# Patient Record
Sex: FEMALE | Race: WHITE | NOT HISPANIC OR LATINO | Employment: FULL TIME | URBAN - METROPOLITAN AREA
[De-identification: names, ages, dates, MRNs, and addresses within clinical notes are randomized per-mention and may not be internally consistent; named-entity substitution may affect disease eponyms.]

---

## 2020-01-31 ENCOUNTER — OFFICE VISIT (OUTPATIENT)
Dept: URGENT CARE | Facility: CLINIC | Age: 35
End: 2020-01-31
Payer: COMMERCIAL

## 2020-01-31 VITALS
BODY MASS INDEX: 24.01 KG/M2 | HEIGHT: 67 IN | SYSTOLIC BLOOD PRESSURE: 122 MMHG | DIASTOLIC BLOOD PRESSURE: 81 MMHG | WEIGHT: 153 LBS | OXYGEN SATURATION: 100 % | TEMPERATURE: 99 F | HEART RATE: 68 BPM

## 2020-01-31 DIAGNOSIS — R30.0 DYSURIA: ICD-10-CM

## 2020-01-31 DIAGNOSIS — N39.0 URINARY TRACT INFECTION WITHOUT HEMATURIA, SITE UNSPECIFIED: Primary | ICD-10-CM

## 2020-01-31 DIAGNOSIS — M62.838 NECK MUSCLE SPASM: ICD-10-CM

## 2020-01-31 DIAGNOSIS — R51.9 NONINTRACTABLE HEADACHE, UNSPECIFIED CHRONICITY PATTERN, UNSPECIFIED HEADACHE TYPE: ICD-10-CM

## 2020-01-31 LAB
BILIRUB UR QL STRIP: POSITIVE
GLUCOSE UR QL STRIP: POSITIVE
KETONES UR QL STRIP: POSITIVE
LEUKOCYTE ESTERASE UR QL STRIP: NEGATIVE
PH, POC UA: 5.5 (ref 5–8)
POC BLOOD, URINE: NEGATIVE
POC NITRATES, URINE: POSITIVE
PROT UR QL STRIP: POSITIVE
SP GR UR STRIP: 1.02 (ref 1–1.03)
UROBILINOGEN UR STRIP-ACNC: NORMAL (ref 0.1–1.1)

## 2020-01-31 PROCEDURE — 81003 POCT URINALYSIS, DIPSTICK, AUTOMATED, W/O SCOPE: ICD-10-PCS | Mod: QW,S$GLB,, | Performed by: PHYSICIAN ASSISTANT

## 2020-01-31 PROCEDURE — 99203 OFFICE O/P NEW LOW 30 MIN: CPT | Mod: 25,S$GLB,, | Performed by: PHYSICIAN ASSISTANT

## 2020-01-31 PROCEDURE — 81003 URINALYSIS AUTO W/O SCOPE: CPT | Mod: QW,S$GLB,, | Performed by: PHYSICIAN ASSISTANT

## 2020-01-31 PROCEDURE — 99203 PR OFFICE/OUTPT VISIT, NEW, LEVL III, 30-44 MIN: ICD-10-PCS | Mod: 25,S$GLB,, | Performed by: PHYSICIAN ASSISTANT

## 2020-01-31 RX ORDER — ALPRAZOLAM 2 MG/1
2 TABLET ORAL
COMMUNITY

## 2020-01-31 RX ORDER — CIPROFLOXACIN 250 MG/1
TABLET, FILM COATED ORAL
COMMUNITY
Start: 2019-12-04

## 2020-01-31 RX ORDER — CYCLOBENZAPRINE HCL 10 MG
TABLET ORAL
COMMUNITY
Start: 2019-12-09

## 2020-01-31 RX ORDER — METHOCARBAMOL 750 MG/1
750 TABLET, FILM COATED ORAL 3 TIMES DAILY
Qty: 30 TABLET | Refills: 0 | Status: SHIPPED | OUTPATIENT
Start: 2020-01-31 | End: 2020-02-10

## 2020-01-31 RX ORDER — ALPRAZOLAM 2 MG/1
TABLET ORAL
COMMUNITY
Start: 2020-01-23

## 2020-02-01 NOTE — PATIENT INSTRUCTIONS
Neck Spasm     A spasm of the neck muscles can happen after a sudden awkward neck movement. Sleeping with your neck in a crooked position can also cause spasm. Some people respond to emotional stress by tensing the muscles of their neck, shoulders, and upper back. If neck spasm lasts long enough, it can cause headache.  The treatment described below will usually help the pain to go away in 5 to 7 days. Pain that continues may need further evaluation or other types of treatment such as physical therapy.  Home care  · Rest and relax the muscles. Use a comfortable pillow that supports the head and keeps the spine in a neutral position. The position of the head should not be tilted forward or backward. A rolled up towel may help for a custom fit.  · Some people find relief with heat. Heat can be applied with either a warm shower or bath or a moist towel heated in the microwave and massage. Others prefer cold packs. You can make an ice pack by filling a plastic bag that seals at the top with ice cubes or crushed ice and then wrapping it with a thin towel. Try both and use the method that feels best for 15 to 20 minutes, several times a day.  · Whether using ice or heat, be careful that you do not injure your skin. Never put ice directly on the skin. Always wrap the ice in a towel or other type of cloth. This is very important, especially in people with poor skin sensations.  · Try to reduce your stress level. Emotional stress can lead to neck muscle tension and get in the way of or delay the healing process.  · You may use over-the-counter pain medicine to control pain, unless another medicine was prescribed.If you have chronic liver or kidney disease or ever had a stomach ulcer or GI bleeding, talk with your healthcare provider before using these medicines.  Follow-up care  Follow up with your healthcare provider if your symptoms do not show signs of improvement after one week. Physical therapy or further tests may be  needed.  If X-rays, CT scans, or MRI scans were taken, you will be told of any new findings that may affect your care.  Call 911  Call 911 if you have:  · Sudden weakness or numbness in one or both arms  · Neck swelling, difficulty or painful swallowing  · Difficulty breathing  · Chest pain  When to seek medical advice  Call your healthcare provider right away if any of these occur:  · Pain becomes worse or spreads into one or both arms  · Increasing headache with nausea or vomiting  · Fever of 100.4°F (38°C) or above lasting for 24 to 48 hours  Date Last Reviewed: 11/21/2015  © 7866-4244 [employer]. [address] [place] [address], [place] [address]. All rights reserved. This information is not intended as a substitute for professional medical care. Always follow your healthcare professional's instructions.

## 2020-02-01 NOTE — PROGRESS NOTES
"Subjective:       Patient ID: Deepti Harding is a 34 y.o. female.    Vitals:  height is 5' 7" (1.702 m) and weight is 69.4 kg (153 lb). Her tympanic temperature is 98.5 °F (36.9 °C). Her blood pressure is 122/81 and her pulse is 68. Her oxygen saturation is 100%.     Chief Complaint: Urinary Tract Infection; Headache; and Neck Pain    Pt stated she knows she has a UTI and is currently on macrobid.     said she could pee in the cup in case the antibiotics that she is currently on is not working, than she will have more.     Seeing pain management on the 11th for "real bad headaches that are not migraines"    Urinary Tract Infection    This is a chronic problem. The current episode started in the past 7 days. The problem occurs every urination. The quality of the pain is described as aching. Pertinent negatives include no chills, frequency, hematuria, nausea, urgency, vomiting or rash. Her past medical history is significant for kidney stones and recurrent UTIs.   Headache    This is a chronic problem. The current episode started more than 1 month ago. The problem occurs daily. The problem has been gradually worsening. The pain is located in the temporal region. The pain radiates to the upper back, right neck and right shoulder. The quality of the pain is described as aching, pulsating, stabbing and throbbing. The pain is severe. Associated symptoms include dizziness, neck pain (Pain seems to radiate from the right side of the head, down the right side of the neck into the right shoulder blade area.  Pt has trouble looking to the right), phonophobia and photophobia. Pertinent negatives include no abdominal pain, back pain, blurred vision, ear pain (right jaw pain), eye pain, fever, loss of balance, nausea, scalp tenderness, tinnitus, vomiting or weakness. There is no history of migraine headaches.       Constitution: Negative for chills, sweating and fever.   HENT: Negative for ear pain (right jaw pain), " tinnitus, facial swelling, congestion and sinus pain.    Neck: Positive for neck pain (Pain seems to radiate from the right side of the head, down the right side of the neck into the right shoulder blade area.  Pt has trouble looking to the right). Negative for neck stiffness and painful lymph nodes.   Eyes: Positive for photophobia. Negative for eye pain, vision loss, double vision and blurred vision.   Gastrointestinal: Negative for abdominal pain, nausea and vomiting.   Genitourinary: Positive for dysuria and pelvic pain. Negative for frequency, urgency, urine decreased, hematuria, history of kidney stones, painful menstruation, irregular menstruation, missed menses, heavy menstrual bleeding, ovarian cysts, genital trauma, vaginal pain, vaginal discharge, vaginal bleeding, vaginal odor, painful intercourse, genital sore and painful ejaculation.        Pt thinks she  is on day 2 of macrobid.   Musculoskeletal: Positive for muscle ache. Negative for trauma and back pain.   Skin: Negative for rash, wound and lesion.   Allergic/Immunologic: Positive for recurrent sinus infections (psoriasis).   Neurological: Positive for dizziness, light-headedness, passing out, coordination disturbances and headaches. Negative for history of vertigo, facial drooping, speech difficulty, loss of balance, history of migraines, disorientation and loss of consciousness.   Hematologic/Lymphatic: Negative for swollen lymph nodes.   Psychiatric/Behavioral: Positive for confusion. Negative for disorientation, nervous/anxious, sleep disturbance and depression. The patient is not nervous/anxious.        Objective:      Physical Exam   Constitutional: She is oriented to person, place, and time. She appears well-developed and well-nourished.  Non-toxic appearance. She does not appear ill. No distress.   HENT:   Head: Normocephalic and atraumatic.   Right Ear: Hearing, tympanic membrane, external ear and ear canal normal.   Left Ear: Hearing,  tympanic membrane, external ear and ear canal normal.   Nose: Nose normal. No mucosal edema, rhinorrhea or nasal deformity. No epistaxis. Right sinus exhibits no maxillary sinus tenderness and no frontal sinus tenderness. Left sinus exhibits no maxillary sinus tenderness and no frontal sinus tenderness.   Mouth/Throat: Uvula is midline, oropharynx is clear and moist and mucous membranes are normal. No trismus in the jaw. Normal dentition. No uvula swelling. No posterior oropharyngeal erythema.   Eyes: Pupils are equal, round, and reactive to light. Conjunctivae, EOM and lids are normal. No scleral icterus.   Neck: Trachea normal, normal range of motion, full passive range of motion without pain and phonation normal. Neck supple. No neck rigidity.   Cardiovascular: Normal rate, regular rhythm, normal heart sounds, intact distal pulses and normal pulses.   Pulmonary/Chest: Effort normal and breath sounds normal. No respiratory distress.   Abdominal: Soft. Normal appearance and bowel sounds are normal. She exhibits no distension. There is tenderness in the suprapubic area.   Musculoskeletal: Normal range of motion. She exhibits no edema or deformity.   Palpable spasming right paraspinous muscle in to trapezius area   Neurological: She is alert and oriented to person, place, and time. No cranial nerve deficit. She exhibits normal muscle tone. Coordination normal.   Skin: Skin is warm, dry, intact, not diaphoretic and not pale.   Psychiatric: She has a normal mood and affect. Her speech is normal and behavior is normal. Judgment and thought content normal. Cognition and memory are normal.   Nursing note and vitals reviewed.        Assessment:       1. Urinary tract infection without hematuria, site unspecified    2. Dysuria    3. Neck muscle spasm    4. Nonintractable headache, unspecified chronicity pattern, unspecified headache type        Plan:         Urinary tract infection without hematuria, site  unspecified    Dysuria  -     POCT Urinalysis, Dipstick, Automated, W/O Scope  Results for orders placed or performed in visit on 01/31/20   POCT Urinalysis, Dipstick, Automated, W/O Scope   Result Value Ref Range    POC Blood, Urine Negative Negative    POC Bilirubin, Urine Positive (A) Negative    POC Urobilinogen, Urine normal 0.1 - 1.1    POC Ketones, Urine Positive (A) Negative    POC Protein, Urine Positive (A) Negative    POC Nitrates, Urine Positive (A) Negative    POC Glucose, Urine Positive (A) Negative    pH, UA 5.5 5 - 8    POC Specific Gravity, Urine 1.020 1.003 - 1.029    POC Leukocytes, Urine Negative Negative     -     Culture, Urine    Patient took azo today, discussed may have altered urinalysis because of color of urine.  Offered to treat with 3 day course of Cipro.  Patient states she has Cipro at home and has the ability to take 500 mg b.i.d. for 3 days.  She had Macrobid that she is currently on from previous infection.  Discussed adequate intake of water and follow up with PCP if symptoms do not improve.      Neck muscle spasm    Nonintractable headache, unspecified chronicity pattern, unspecified headache type    Patient likely with tension headache.  She is currently taking ibuprofen.  She is allergic to Toradol.  Discussed small amount (5 pills) of Fioricet.  She states that this does not work for her.  I reviewed  and patient has multiple prescriptions from multiple different physicians. She is currently taking Xanax.  I do not believe it would benefit patient any to prescribe further narcotics at this time.        Other orders  -     methocarbamol (ROBAXIN) 750 MG Tab; Take 1 tablet (750 mg total) by mouth 3 (three) times daily. for 10 days  Dispense: 30 tablet; Refill: 0    Neck Spasm     A spasm of the neck muscles can happen after a sudden awkward neck movement. Sleeping with your neck in a crooked position can also cause spasm. Some people respond to emotional stress by tensing the  muscles of their neck, shoulders, and upper back. If neck spasm lasts long enough, it can cause headache.  The treatment described below will usually help the pain to go away in 5 to 7 days. Pain that continues may need further evaluation or other types of treatment such as physical therapy.  Home care  · Rest and relax the muscles. Use a comfortable pillow that supports the head and keeps the spine in a neutral position. The position of the head should not be tilted forward or backward. A rolled up towel may help for a custom fit.  · Some people find relief with heat. Heat can be applied with either a warm shower or bath or a moist towel heated in the microwave and massage. Others prefer cold packs. You can make an ice pack by filling a plastic bag that seals at the top with ice cubes or crushed ice and then wrapping it with a thin towel. Try both and use the method that feels best for 15 to 20 minutes, several times a day.  · Whether using ice or heat, be careful that you do not injure your skin. Never put ice directly on the skin. Always wrap the ice in a towel or other type of cloth. This is very important, especially in people with poor skin sensations.  · Try to reduce your stress level. Emotional stress can lead to neck muscle tension and get in the way of or delay the healing process.  · You may use over-the-counter pain medicine to control pain, unless another medicine was prescribed.If you have chronic liver or kidney disease or ever had a stomach ulcer or GI bleeding, talk with your healthcare provider before using these medicines.  Follow-up care  Follow up with your healthcare provider if your symptoms do not show signs of improvement after one week. Physical therapy or further tests may be needed.  If X-rays, CT scans, or MRI scans were taken, you will be told of any new findings that may affect your care.  Call 911  Call 911 if you have:  · Sudden weakness or numbness in one or both arms  · Neck  swelling, difficulty or painful swallowing  · Difficulty breathing  · Chest pain  When to seek medical advice  Call your healthcare provider right away if any of these occur:  · Pain becomes worse or spreads into one or both arms  · Increasing headache with nausea or vomiting  · Fever of 100.4°F (38°C) or above lasting for 24 to 48 hours  Date Last Reviewed: 11/21/2015  © 3123-5074 [employer]. [address] [place] [address], [place] [address]. All rights reserved. This information is not intended as a substitute for professional medical care. Always follow your healthcare professional's instructions.

## 2020-02-06 ENCOUNTER — TELEPHONE (OUTPATIENT)
Dept: URGENT CARE | Facility: CLINIC | Age: 35
End: 2020-02-06

## 2020-02-06 LAB
BACTERIA UR CULT: NO GROWTH
BACTERIA UR CULT: NORMAL

## 2020-02-10 ENCOUNTER — TELEPHONE (OUTPATIENT)
Dept: URGENT CARE | Facility: CLINIC | Age: 35
End: 2020-02-10

## 2024-08-09 DIAGNOSIS — Z76.89 ESTABLISHING CARE WITH NEW DOCTOR, ENCOUNTER FOR: Primary | ICD-10-CM

## 2024-08-09 DIAGNOSIS — R01.1 MURMUR: ICD-10-CM

## 2024-08-12 ENCOUNTER — OFFICE VISIT (OUTPATIENT)
Dept: CARDIOLOGY | Facility: CLINIC | Age: 39
End: 2024-08-12
Payer: COMMERCIAL

## 2024-08-12 ENCOUNTER — HOSPITAL ENCOUNTER (OUTPATIENT)
Dept: CARDIOLOGY | Facility: HOSPITAL | Age: 39
Discharge: HOME | End: 2024-08-12
Attending: STUDENT IN AN ORGANIZED HEALTH CARE EDUCATION/TRAINING PROGRAM
Payer: COMMERCIAL

## 2024-08-12 VITALS
BODY MASS INDEX: 24.24 KG/M2 | HEART RATE: 87 BPM | DIASTOLIC BLOOD PRESSURE: 80 MMHG | WEIGHT: 154.75 LBS | SYSTOLIC BLOOD PRESSURE: 140 MMHG | OXYGEN SATURATION: 98 %

## 2024-08-12 DIAGNOSIS — R01.1 MURMUR: ICD-10-CM

## 2024-08-12 DIAGNOSIS — R06.02 SOB (SHORTNESS OF BREATH): ICD-10-CM

## 2024-08-12 DIAGNOSIS — Z72.0 TOBACCO ABUSE: ICD-10-CM

## 2024-08-12 DIAGNOSIS — R07.9 CHEST PAIN, UNSPECIFIED TYPE: ICD-10-CM

## 2024-08-12 DIAGNOSIS — M54.6 CHRONIC THORACIC SPINE PAIN: ICD-10-CM

## 2024-08-12 DIAGNOSIS — G89.29 CHRONIC THORACIC SPINE PAIN: ICD-10-CM

## 2024-08-12 DIAGNOSIS — Z76.89 ESTABLISHING CARE WITH NEW DOCTOR, ENCOUNTER FOR: ICD-10-CM

## 2024-08-12 DIAGNOSIS — Q21.3 TETRALOGY OF FALLOT: ICD-10-CM

## 2024-08-12 DIAGNOSIS — R53.1 WEAKNESS: ICD-10-CM

## 2024-08-12 DIAGNOSIS — R56.9 SEIZURE: ICD-10-CM

## 2024-08-12 DIAGNOSIS — Z95.2 HX OF PULMONIC VALVE REPLACEMENT: ICD-10-CM

## 2024-08-12 DIAGNOSIS — Z87.74 H/O TETRALOGY OF FALLOT REPAIR: Primary | ICD-10-CM

## 2024-08-12 LAB
OHS QRS DURATION: 142 MS
OHS QTC CALCULATION: 495 MS

## 2024-08-12 PROCEDURE — 99999 PR PBB SHADOW E&M-EST. PATIENT-LVL IV: CPT | Mod: PBBFAC,,, | Performed by: STUDENT IN AN ORGANIZED HEALTH CARE EDUCATION/TRAINING PROGRAM

## 2024-08-12 PROCEDURE — 99204 OFFICE O/P NEW MOD 45 MIN: CPT | Mod: S$GLB,,, | Performed by: STUDENT IN AN ORGANIZED HEALTH CARE EDUCATION/TRAINING PROGRAM

## 2024-08-12 PROCEDURE — 93005 ELECTROCARDIOGRAM TRACING: CPT | Mod: PO

## 2024-08-12 PROCEDURE — 93010 ELECTROCARDIOGRAM REPORT: CPT | Mod: ,,, | Performed by: INTERNAL MEDICINE

## 2024-08-12 PROCEDURE — 3079F DIAST BP 80-89 MM HG: CPT | Mod: CPTII,S$GLB,, | Performed by: STUDENT IN AN ORGANIZED HEALTH CARE EDUCATION/TRAINING PROGRAM

## 2024-08-12 PROCEDURE — 3077F SYST BP >= 140 MM HG: CPT | Mod: CPTII,S$GLB,, | Performed by: STUDENT IN AN ORGANIZED HEALTH CARE EDUCATION/TRAINING PROGRAM

## 2024-08-12 PROCEDURE — 3008F BODY MASS INDEX DOCD: CPT | Mod: CPTII,S$GLB,, | Performed by: STUDENT IN AN ORGANIZED HEALTH CARE EDUCATION/TRAINING PROGRAM

## 2024-08-12 PROCEDURE — 1159F MED LIST DOCD IN RCRD: CPT | Mod: CPTII,S$GLB,, | Performed by: STUDENT IN AN ORGANIZED HEALTH CARE EDUCATION/TRAINING PROGRAM

## 2024-08-12 NOTE — PROGRESS NOTES
Section of Cardiology                  Cardiac Clinic Note    Chief Complaint/Reason for consultation: h/o TOF      HPI:   Deepti Harding is a 39 y.o. female with h/o TOF s/p 2 surgeries, seizures who was referred to cardiology clinic by Dr. Rosenthal for evaluation.       8/12/24  Had TOF repair in the past - followed up at [facility] in [place] (forgot the name of the doctor)- adult congenital department   At 1 year old, had full surgery for TOF repair  15 yo- stent placed for [place]  First pregnancy 18 yo, was told she was in CHF at age 19 yo based on cardiac MRI   20 yo pulmonary valve replaced completley replaced- bioprosthetic     Has had total of 3 pregnancies (1 miscarriage)  Reports CHF- cardiac MRI   Has issue with insomnia   Will be having spinal injections  Will start semaglutide after spinal injection   Constant neck pain, back pain, falls regularly, constantly weak   Reports SOB  BP high today- reports headache     Currently does not work  Vapes  Denies etoh   Does not exercise regularly  Family history:  Paternal grandfather- heart disease, PPM ; paternal aunt- PPM    EKG 8/12/24 NSR, RBBB- stable per patient     ECHO  No results found for this or any previous visit.       STRESS TEST No results found for this or any previous visit.       LHC No results found for this or any previous visit.            ROS: All 10 systems reviewed. Please refer to the HPI for pertinent positives. All other systems negative.     Past Medical History  Past Medical History:   Diagnosis Date    Chronic headaches     Lumbar herniated disc        Surgical History  Past Surgical History:   Procedure Laterality Date    CHOLECYSTECTOMY      HYSTERECTOMY      ovarian cyct      pulmonary stent      PULMONARY VALVE REPLACEMENT      child          Allergies:   Review of patient's allergies indicates:   Allergen Reactions    Ascorbic acid Nausea And Vomiting and Swelling     Gums swell    Shellfish containing products Anaphylaxis     "Ascorbate calcium Nausea And Vomiting    Bupropion Other (See Comments)    Iodine Hives     "I code"    Ketorolac Hives     Body spasms    Latex Hives    Orange oil Other (See Comments)    Penicillins Hives    Loratadine Anxiety    Sumatriptan Nausea And Vomiting       Social History:  Social History     Socioeconomic History    Marital status:    Tobacco Use    Smoking status: Every Day     Types: Vaping with nicotine    Smokeless tobacco: Never     Social Determinants of Health     Financial Resource Strain: Medium Risk (8/9/2024)    Overall Financial Resource Strain (CARDIA)     Difficulty of Paying Living Expenses: Somewhat hard   Food Insecurity: Food Insecurity Present (8/9/2024)    Hunger Vital Sign     Worried About Running Out of Food in the Last Year: Sometimes true     Ran Out of Food in the Last Year: Sometimes true   Transportation Needs: No Transportation Needs (8/7/2023)    Received from [facility]     Lack of Transportation (Medical): No     Lack of Transportation (Non-Medical): No   Physical Activity: Unknown (8/9/2024)    Exercise Vital Sign     Days of Exercise per Week: 0 days   Stress: Stress Concern Present (8/9/2024)    [employer] - Occupational Stress Questionnaire     Feeling of Stress : Very much   Housing Stability: Unknown (8/9/2024)    Housing Stability Vital Sign     Unable to Pay for Housing in the Last Year: Patient declined       Family History:  family history includes Cancer in her maternal grandmother, paternal aunt, and paternal grandmother; Dementia in her maternal grandmother; Diabetes in her maternal grandmother and paternal grandmother; Thyroid disease in her mother.    Home Medications:  Current Outpatient Medications on File Prior to Visit   Medication Sig Dispense Refill    ALPRAZolam (XANAX) 2 MG Tab       gabapentin (NEURONTIN) 100 MG capsule TAKE 1 CAPSULE BY MOUTH THREE TIMES A DAY AS DIRECTED " FOR 30 DAYS      HYDROcodone-acetaminophen (NORCO) 7.5-325 mg per tablet TAKE 1 TABLET BY MOUTH THREE TIMES A DAY AS NEEDED FOR 30 DAYS      lamoTRIgine (LAMICTAL) 100 MG tablet Take 100 mg by mouth.      onabotulinumtoxina (BOTOX) 200 unit SolR Inject into the muscle.      tiZANidine (ZANAFLEX) 4 MG tablet Take 4 mg by mouth 2 (two) times daily as needed.      lurasidone (LATUDA) 20 mg Tab tablet Take 1 tablet (20 mg total) by mouth once daily. (Patient not taking: Reported on 8/12/2024) 30 tablet 0    NAYZILAM 5 mg/spray (0.1 mL) Spry USE 1 SPRAY IN NOSTRIL ONCE DAILY AS DIRECTED AS NEEDE (Patient not taking: Reported on 8/12/2024)      UNABLE TO FIND medication name: semaglutide / b12 - .25 mg/per syringe. [facility] to [facility]. (Patient not taking: Reported on 8/12/2024)       No current facility-administered medications on file prior to visit.       Physical exam:  BP (!) 140/80 (BP Location: Left arm, Patient Position: Sitting, BP Method: Small (Manual))   Pulse 87   Wt 70.2 kg (154 lb 12.2 oz)   LMP  (LMP Unknown)   SpO2 98%   BMI 24.24 kg/m²         General: Pt is a 39 y.o. year old female who is AAOx3, in NAD, is pleasant, well nourished, looks stated age  HEENT: PERRL, EOMI, Oral mucosa pink & moist  CVS  No abnormal cardiac pulsations noted on inspection. JVP not raised. The apical impulse is normal on palpation, and is located in the left 5th intercostal space in the mid - clavicular line. No palpable thrills or abnormal pulsations noted. RR, S1 - S2 heard, 3/6 diastolic murmur at left ICS, rubs or gallops appreciated.   PUL : CTA B/L. No wheezes/crackles heard   ABD : BS +, soft. No tenderness elicited   LE : No C/C/E. Distal Pulses palpable B/L         LABS:    Chemistry:   Lab Results   Component Value Date     06/16/2023    K 4.4 06/16/2023    CO2 24 06/16/2023    BUN 13 06/16/2023    CREATININE 0.95 06/16/2023    GLUCOSE 90 06/16/2023    CALCIUM 10.3 06/16/2023  "    Cardiac Markers: No results found for: "CKTOTAL", "CKMB", "CKMBINDEX", "TROPONINI"  Cardiac Markers (Last 3): No results found for: "CKTOTAL", "CKMB", "CKMBINDEX", "TROPONINI"  CBC: No results found for: "WBC", "HGB", "HCT", "MCV", "PLT"  Lipids: No results found for: "CHOL", "TRIG", "HDL", "LDLDIRECT"  Coagulation: No results found for: "PT", "INR", "APTT"        Assessment        1. H/O tetralogy of Fallot repair    2. Tetralogy of Fallot    3. Hx of pulmonic valve replacement    4. SOB (shortness of breath)    5. Chest pain, unspecified type    6. Seizure    7. Murmur    8. Tobacco abuse    9. Chronic thoracic spine pain    10. Weakness         Plan:    Tetralogy of flow repair/pulmonic valve replacement with bioprosthetic valve   Reports: Chronic weakness  Obtain echo   Obtain records from prior cardiologist office   Reports shortness of breath, intermittent chest pain- will hold off on stress test for now    Tobacco abuse   Vapes.  Tobacco cessation encouraged    Seizures   Currently on Lamictal    Chronic thoracic pain   Gets spinal injection      Low salt, low fat diet  Exercise as tolerated, at least 30 min daily     This note was prepared using voice recognition system and is likely to have sound alike errors that may have been overlooked even after proofreading.     I have reviewed all pertinent chart information.  Plans and recommendations have been formulated under my direct supervision. All questions answered and patient voiced understanding.   If symptoms persist go to the ED.    RTC in 1m        Lula Willingham MD  Cardiology          "

## 2024-08-20 ENCOUNTER — TELEPHONE (OUTPATIENT)
Dept: CARDIOLOGY | Facility: CLINIC | Age: 39
End: 2024-08-20
Payer: COMMERCIAL

## 2024-08-22 ENCOUNTER — TELEPHONE (OUTPATIENT)
Dept: CARDIOLOGY | Facility: CLINIC | Age: 39
End: 2024-08-22
Payer: COMMERCIAL

## 2024-08-22 NOTE — TELEPHONE ENCOUNTER
PT stated she overslept for today's apt and she wanted to reschedule. Pt was rescheduled for tomorrow      ----- Message from Yu Poole sent at 8/22/2024 10:02 AM CDT -----  Type:  Patient Returning Call    Who Called:pt   Who Left Message for Patient:  Does the patient know what this is regarding?:echo reschedule   Would the patient rather a call back or a response via [facility]? call  Best Call Back Number:[phone]  Additional Information: states she would like to reschedule missed echo for later today if possible

## 2024-08-23 ENCOUNTER — HOSPITAL ENCOUNTER (OUTPATIENT)
Dept: CARDIOLOGY | Facility: HOSPITAL | Age: 39
Discharge: HOME | End: 2024-08-23
Attending: STUDENT IN AN ORGANIZED HEALTH CARE EDUCATION/TRAINING PROGRAM
Payer: COMMERCIAL

## 2024-08-23 VITALS
DIASTOLIC BLOOD PRESSURE: 80 MMHG | WEIGHT: 154 LBS | SYSTOLIC BLOOD PRESSURE: 140 MMHG | HEIGHT: 67 IN | BODY MASS INDEX: 24.17 KG/M2

## 2024-08-23 DIAGNOSIS — R06.02 SOB (SHORTNESS OF BREATH): ICD-10-CM

## 2024-08-23 DIAGNOSIS — R07.9 CHEST PAIN, UNSPECIFIED TYPE: ICD-10-CM

## 2024-08-23 DIAGNOSIS — Z87.74 H/O TETRALOGY OF FALLOT REPAIR: ICD-10-CM

## 2024-08-23 DIAGNOSIS — Z95.2 HX OF PULMONIC VALVE REPLACEMENT: ICD-10-CM

## 2024-08-23 LAB
AORTIC ROOT ANNULUS: 2.91 CM
AV INDEX (PROSTH): 1
AV MEAN GRADIENT: 3 MMHG
AV PEAK GRADIENT: 5 MMHG
AV VALVE AREA BY VELOCITY RATIO: 3.04 CM²
AV VALVE AREA: 3.19 CM²
AV VELOCITY RATIO: 0.95
BSA FOR ECHO PROCEDURE: 1.82 M2
CV ECHO LV RWT: 0.46 CM
DOP CALC AO PEAK VEL: 1.16 M/S
DOP CALC AO VTI: 22.3 CM
DOP CALC LVOT AREA: 3.2 CM2
DOP CALC LVOT DIAMETER: 2.02 CM
DOP CALC LVOT PEAK VEL: 1.1 M/S
DOP CALC LVOT STROKE VOLUME: 71.11 CM3
DOP CALC RVOT PEAK VEL: 0.91 M/S
DOP CALC RVOT VTI: 26 CM
DOP CALCLVOT PEAK VEL VTI: 22.2 CM
E WAVE DECELERATION TIME: 248.84 MSEC
E/A RATIO: 1.22
E/E' RATIO: 18.33 M/S
ECHO LV POSTERIOR WALL: 0.83 CM (ref 0.6–1.1)
EJECTION FRACTION: 60 %
FRACTIONAL SHORTENING: 24 % (ref 28–44)
INTERVENTRICULAR SEPTUM: 0.91 CM (ref 0.6–1.1)
IVRT: 79.92 MSEC
LA MAJOR: 3.88 CM
LA MINOR: 3.7 CM
LA WIDTH: 2.9 CM
LEFT ATRIUM AREA SYSTOLIC (APICAL 2 CHAMBER): 11.87 CM2
LEFT ATRIUM AREA SYSTOLIC (APICAL 4 CHAMBER): 12.42 CM2
LEFT ATRIUM SIZE: 3.12 CM
LEFT ATRIUM VOLUME INDEX MOD: 15.2 ML/M2
LEFT ATRIUM VOLUME INDEX: 16.1 ML/M2
LEFT ATRIUM VOLUME MOD: 27.59 CM3
LEFT ATRIUM VOLUME: 29.13 CM3
LEFT INTERNAL DIMENSION IN SYSTOLE: 2.74 CM (ref 2.1–4)
LEFT VENTRICLE DIASTOLIC VOLUME INDEX: 29.88 ML/M2
LEFT VENTRICLE DIASTOLIC VOLUME: 54.08 ML
LEFT VENTRICLE END SYSTOLIC VOLUME APICAL 2 CHAMBER: 26.59 ML
LEFT VENTRICLE END SYSTOLIC VOLUME APICAL 4 CHAMBER: 28.14 ML
LEFT VENTRICLE MASS INDEX: 49 G/M2
LEFT VENTRICLE SYSTOLIC VOLUME INDEX: 15.4 ML/M2
LEFT VENTRICLE SYSTOLIC VOLUME: 27.9 ML
LEFT VENTRICULAR INTERNAL DIMENSION IN DIASTOLE: 3.59 CM (ref 3.5–6)
LEFT VENTRICULAR MASS: 88.09 G
LV LATERAL E/E' RATIO: 13.75 M/S
LV SEPTAL E/E' RATIO: 27.5 M/S
LVED V (TEICH): 54.08 ML
LVES V (TEICH): 27.9 ML
LVOT MG: 2.77 MMHG
LVOT MV: 0.78 CM/S
MV PEAK A VEL: 0.9 M/S
MV PEAK E VEL: 1.1 M/S
OHS CV RV/LV RATIO: 0.68 CM
PISA TR MAX VEL: 3.27 M/S
PV MEAN GRADIENT: 2 MMHG
PV MV: 1.57 M/S
PV PEAK GRADIENT: 27 MMHG
PV PEAK VELOCITY: 2.61 M/S
RA MAJOR: 4.35 CM
RA PRESSURE ESTIMATED: 3 MMHG
RA WIDTH: 3.01 CM
RIGHT VENTRICULAR END-DIASTOLIC DIMENSION: 2.45 CM
RV TB RVSP: 6 MMHG
TDI LATERAL: 0.08 M/S
TDI SEPTAL: 0.04 M/S
TDI: 0.06 M/S
TR MAX PG: 43 MMHG
TV REST PULMONARY ARTERY PRESSURE: 46 MMHG
Z-SCORE OF LEFT VENTRICULAR DIMENSION IN END DIASTOLE: -3.33
Z-SCORE OF LEFT VENTRICULAR DIMENSION IN END SYSTOLE: -0.96

## 2024-08-23 PROCEDURE — 93306 TTE W/DOPPLER COMPLETE: CPT | Mod: 26,,, | Performed by: INTERNAL MEDICINE

## 2024-08-23 PROCEDURE — 93306 TTE W/DOPPLER COMPLETE: CPT

## 2024-09-27 ENCOUNTER — TELEPHONE (OUTPATIENT)
Dept: CARDIOLOGY | Facility: CLINIC | Age: 39
End: 2024-09-27

## 2024-09-27 ENCOUNTER — OFFICE VISIT (OUTPATIENT)
Dept: CARDIOLOGY | Facility: CLINIC | Age: 39
End: 2024-09-27
Payer: COMMERCIAL

## 2024-09-27 VITALS
DIASTOLIC BLOOD PRESSURE: 90 MMHG | HEART RATE: 96 BPM | HEIGHT: 67 IN | OXYGEN SATURATION: 96 % | WEIGHT: 150.38 LBS | SYSTOLIC BLOOD PRESSURE: 150 MMHG | BODY MASS INDEX: 23.6 KG/M2

## 2024-09-27 DIAGNOSIS — R06.02 SOB (SHORTNESS OF BREATH): ICD-10-CM

## 2024-09-27 DIAGNOSIS — Z72.0 TOBACCO ABUSE: ICD-10-CM

## 2024-09-27 DIAGNOSIS — R07.9 CHEST PAIN, UNSPECIFIED TYPE: ICD-10-CM

## 2024-09-27 DIAGNOSIS — G89.29 CHRONIC THORACIC SPINE PAIN: ICD-10-CM

## 2024-09-27 DIAGNOSIS — M54.6 CHRONIC THORACIC SPINE PAIN: ICD-10-CM

## 2024-09-27 DIAGNOSIS — R56.9 SEIZURE: ICD-10-CM

## 2024-09-27 DIAGNOSIS — Z87.74 H/O TETRALOGY OF FALLOT REPAIR: ICD-10-CM

## 2024-09-27 DIAGNOSIS — Z95.2 HX OF PULMONIC VALVE REPLACEMENT: Primary | ICD-10-CM

## 2024-09-27 PROCEDURE — 99999 PR PBB SHADOW E&M-EST. PATIENT-LVL III: CPT | Mod: PBBFAC,,, | Performed by: STUDENT IN AN ORGANIZED HEALTH CARE EDUCATION/TRAINING PROGRAM

## 2024-09-27 NOTE — PROGRESS NOTES
Section of Cardiology                  Cardiac Clinic Note    Chief Complaint/Reason for consultation: h/o TOF      HPI:   Deepti Harding is a 39 y.o. female with h/o TOF s/p 2 surgeries, seizures who was referred to cardiology clinic by Dr. Rosenthal for evaluation.       8/12/24  Had TOF repair in the past - followed up at [facility] in [place] (forgot the name of the doctor)- adult congenital department   At 1 year old, had full surgery for TOF repair  13 yo- stent placed for WI  First pregnancy 18 yo, was told she was in CHF at age 19 yo based on cardiac MRI   22 yo pulmonary valve replaced completley replaced- bioprosthetic     Has had total of 3 pregnancies (1 miscarriage)  Reports CHF- cardiac MRI   Has issue with insomnia   Will be having spinal injections  Will start semaglutide after spinal injection   Constant neck pain, back pain, falls regularly, constantly weak   Reports SOB  BP high today- reports headache     Currently does not work  Vapes  Denies etoh   Does not exercise regularly  Family history:  Paternal grandfather- heart disease, PPM ; paternal aunt- PPM      9/27/24  Echo 8/24 with EF 60%, gIdd, PASP 49, mild TR- no significant WI or PS  Still with chronic fatigue  Got dental work recently   No chest pain, SOB, syncope       EKG 8/12/24 NSR, RBBB- stable per patient     ECHO  No results found for this or any previous visit.       STRESS TEST No results found for this or any previous visit.       LHC No results found for this or any previous visit.            ROS: All 10 systems reviewed. Please refer to the HPI for pertinent positives. All other systems negative.     Past Medical History  Past Medical History:   Diagnosis Date    Chronic headaches     Lumbar herniated disc        Surgical History  Past Surgical History:   Procedure Laterality Date    CHOLECYSTECTOMY      HYSTERECTOMY      ovarian cyct      pulmonary stent      PULMONARY VALVE REPLACEMENT      child          Allergies:  "  Review of patient's allergies indicates:   Allergen Reactions    Ascorbic acid Nausea And Vomiting and Swelling     Gums swell    Shellfish containing products Anaphylaxis    Ascorbate calcium Nausea And Vomiting    Bupropion Other (See Comments)    Iodine Hives     "I code"    Ketorolac Hives     Body spasms    Latex Hives    Orange oil Other (See Comments)    Penicillins Hives    Loratadine Anxiety    Sumatriptan Nausea And Vomiting       Social History:  Social History     Socioeconomic History    Marital status:    Tobacco Use    Smoking status: Every Day     Types: Vaping with nicotine    Smokeless tobacco: Never     Social Determinants of Health     Financial Resource Strain: Medium Risk (8/9/2024)    Overall Financial Resource Strain (CARDIA)     Difficulty of Paying Living Expenses: Somewhat hard   Food Insecurity: Food Insecurity Present (8/9/2024)    Hunger Vital Sign     Worried About Running Out of Food in the Last Year: Sometimes true     Ran Out of Food in the Last Year: Sometimes true   Transportation Needs: No Transportation Needs (8/7/2023)    Received from [facility] - Transportation     Lack of Transportation (Medical): No     Lack of Transportation (Non-Medical): No   Physical Activity: Unknown (8/9/2024)    Exercise Vital Sign     Days of Exercise per Week: 0 days   Stress: Stress Concern Present (8/9/2024)    [employer] [place] [employer] - Occupational Stress Questionnaire     Feeling of Stress : Very much   Housing Stability: Unknown (8/9/2024)    Housing Stability Vital Sign     Unable to Pay for Housing in the Last Year: Patient declined       Family History:  family history includes Cancer in her maternal grandmother, paternal aunt, and paternal grandmother; Dementia in her maternal grandmother; Diabetes in her maternal grandmother and paternal grandmother; Thyroid disease in her mother.    Home Medications:  Current Outpatient Medications on File " "Prior to Visit   Medication Sig Dispense Refill    ALPRAZolam (XANAX) 2 MG Tab       gabapentin (NEURONTIN) 100 MG capsule TAKE 1 CAPSULE BY MOUTH THREE TIMES A DAY AS DIRECTED FOR 30 DAYS      HYDROcodone-acetaminophen (NORCO) 7.5-325 mg per tablet TAKE 1 TABLET BY MOUTH THREE TIMES A DAY AS NEEDED FOR 30 DAYS      lamoTRIgine (LAMICTAL) 100 MG tablet Take 100 mg by mouth.      onabotulinumtoxina (BOTOX) 200 unit SolR Inject into the muscle.      tiZANidine (ZANAFLEX) 4 MG tablet Take 4 mg by mouth 2 (two) times daily as needed.      UNABLE TO FIND medication name: semaglutide / b12 - .25 mg/per syringe. Sent to [facility] pharmacy.      lurasidone (LATUDA) 20 mg Tab tablet Take 1 tablet (20 mg total) by mouth once daily. (Patient not taking: Reported on 8/12/2024) 30 tablet 0    NAYZILAM 5 mg/spray (0.1 mL) Spry USE 1 SPRAY IN NOSTRIL ONCE DAILY AS DIRECTED AS NEEDE (Patient not taking: Reported on 8/12/2024)       No current facility-administered medications on file prior to visit.       Physical exam:  BP (!) 150/90 (BP Location: Right arm, Patient Position: Sitting, BP Method: Medium (Manual))   Pulse 96   Ht 5' 7" (1.702 m)   Wt 68.2 kg (150 lb 5.7 oz)   LMP  (LMP Unknown)   SpO2 96%   BMI 23.55 kg/m²         General: Pt is a 39 y.o. year old female who is AAOx3, in NAD, is pleasant, well nourished, looks stated age  HEENT: PERRL, EOMI, Oral mucosa pink & moist  CVS  No abnormal cardiac pulsations noted on inspection. JVP not raised. The apical impulse is normal on palpation, and is located in the left 5th intercostal space in the mid - clavicular line. No palpable thrills or abnormal pulsations noted. RR, S1 - S2 heard, 3/6 diastolic murmur at left ICS, rubs or gallops appreciated.   PUL : CTA B/L. No wheezes/crackles heard   ABD : BS +, soft. No tenderness elicited   LE : No C/C/E. Distal Pulses palpable B/L         LABS:    Chemistry:   Lab Results   Component Value Date     " "06/16/2023    K 4.4 06/16/2023    CO2 24 06/16/2023    BUN 13 06/16/2023    CREATININE 0.95 06/16/2023    GLUCOSE 90 06/16/2023    CALCIUM 10.3 06/16/2023     Cardiac Markers: No results found for: "CKTOTAL", "CKMB", "CKMBINDEX", "TROPONINI"  Cardiac Markers (Last 3): No results found for: "CKTOTAL", "CKMB", "CKMBINDEX", "TROPONINI"  CBC: No results found for: "WBC", "HGB", "HCT", "MCV", "PLT"  Lipids: No results found for: "CHOL", "TRIG", "HDL", "LDLDIRECT"  Coagulation: No results found for: "PT", "INR", "APTT"        Assessment        1. Hx of pulmonic valve replacement    2. Chronic thoracic spine pain    3. H/O tetralogy of Fallot repair    4. Tobacco abuse    5. Seizure    6. SOB (shortness of breath)    7. Chest pain, unspecified type           Plan:    Tetralogy of flow repair/pulmonic valve replacement with bioprosthetic valve   Reports: Chronic weakness  Obtain echo   Obtain records from prior cardiologist office   Reports shortness of breath, intermittent chest pain- will hold off on stress test for now    Tobacco abuse   Vapes.  Tobacco cessation encouraged    Seizures   Currently on Lamictal    Chronic thoracic pain   Gets spinal injection      Low salt, low fat diet  Exercise as tolerated, at least 30 min daily     This note was prepared using voice recognition system and is likely to have sound alike errors that may have been overlooked even after proofreading.     I have reviewed all pertinent chart information.  Plans and recommendations have been formulated under my direct supervision. All questions answered and patient voiced understanding.   If symptoms persist go to the ED.    RTC in 6m        Lula Willingham MD  Cardiology            "

## 2024-10-29 ENCOUNTER — HOSPITAL ENCOUNTER (OUTPATIENT)
Dept: RADIOLOGY | Facility: HOSPITAL | Age: 39
Discharge: HOME | End: 2024-10-29
Payer: COMMERCIAL

## 2024-10-29 DIAGNOSIS — Z12.31 ENCOUNTER FOR SCREENING MAMMOGRAM FOR BREAST CANCER: ICD-10-CM

## 2024-10-29 PROCEDURE — 77063 BREAST TOMOSYNTHESIS BI: CPT | Mod: TC,PO

## 2024-10-29 PROCEDURE — 77067 SCR MAMMO BI INCL CAD: CPT | Mod: TC,PO

## 2025-03-26 DIAGNOSIS — R06.02 SOB (SHORTNESS OF BREATH): Primary | ICD-10-CM

## 2025-04-30 DIAGNOSIS — R06.02 SOB (SHORTNESS OF BREATH): Primary | ICD-10-CM
